# Patient Record
Sex: MALE | Race: WHITE | URBAN - NONMETROPOLITAN AREA
[De-identification: names, ages, dates, MRNs, and addresses within clinical notes are randomized per-mention and may not be internally consistent; named-entity substitution may affect disease eponyms.]

---

## 2021-08-24 ENCOUNTER — WEB ENCOUNTER (OUTPATIENT)
Dept: URBAN - NONMETROPOLITAN AREA CLINIC 13 | Facility: CLINIC | Age: 56
End: 2021-08-24

## 2021-08-24 ENCOUNTER — DASHBOARD ENCOUNTERS (OUTPATIENT)
Age: 56
End: 2021-08-24

## 2021-08-24 ENCOUNTER — OFFICE VISIT (OUTPATIENT)
Dept: URBAN - NONMETROPOLITAN AREA CLINIC 13 | Facility: CLINIC | Age: 56
End: 2021-08-24
Payer: COMMERCIAL

## 2021-08-24 DIAGNOSIS — K57.52 DIVERTICULITIS OF BOTH SMALL AND LARGE INTESTINE: ICD-10-CM

## 2021-08-24 PROCEDURE — 99244 OFF/OP CNSLTJ NEW/EST MOD 40: CPT | Performed by: INTERNAL MEDICINE

## 2021-08-24 RX ORDER — DISULFIRAM 250 MG/1
1 TABLET TABLET ORAL ONCE A DAY
Status: ACTIVE | COMMUNITY

## 2021-08-24 RX ORDER — TRAZODONE HYDROCHLORIDE 100 MG/1
1 TABLET AT BEDTIME TABLET, FILM COATED ORAL ONCE A DAY
Status: ACTIVE | COMMUNITY

## 2021-08-24 RX ORDER — GUAIFENESIN/PHENYLPROPANOLAMIN
AS DIRECTED EXPECTORANT ORAL
Status: ACTIVE | COMMUNITY

## 2021-08-24 RX ORDER — ROSUVASTATIN CALCIUM 10 MG/1
1 TABLET TABLET, FILM COATED ORAL ONCE A DAY
Status: ACTIVE | COMMUNITY

## 2021-08-24 RX ORDER — VENLAFAXINE HYDROCHLORIDE 100 MG/1
1 TABLET WITH FOOD TABLET ORAL ONCE A DAY
Status: ACTIVE | COMMUNITY

## 2021-08-24 RX ORDER — OMEPRAZOLE 10 MG/1
1 CAPSULE 30 MINUTES BEFORE MORNING MEAL CAPSULE, DELAYED RELEASE ORAL ONCE A DAY
Status: ACTIVE | COMMUNITY

## 2021-08-24 RX ORDER — ASPIRIN 81 MG/1
1 TABLET TABLET, CHEWABLE ORAL ONCE A DAY
Status: ACTIVE | COMMUNITY

## 2021-08-24 RX ORDER — MULTIVIT-MIN/IRON/FOLIC ACID/K 18-600-40
AS DIRECTED CAPSULE ORAL
Status: ACTIVE | COMMUNITY

## 2021-08-24 RX ORDER — AMOXICILLIN AND CLAVULANATE POTASSIUM 875; 125 MG/1; MG/1
1 TABLET TABLET, FILM COATED ORAL
Status: ACTIVE | COMMUNITY

## 2021-08-24 RX ORDER — METHYLDOPA/HYDROCHLOROTHIAZIDE 250MG-15MG
AS DIRECTED TABLET ORAL
Status: ACTIVE | COMMUNITY

## 2021-08-24 RX ORDER — PROPRANOLOL HYDROCHLORIDE 80 MG/1
1 TABLET TABLET ORAL TWICE A DAY
Status: ACTIVE | COMMUNITY

## 2021-08-24 RX ORDER — DICYCLOMINE HYDROCHLORIDE 10 MG/1
2 CAPSULES CAPSULE ORAL THREE TIMES A DAY
Status: ACTIVE | COMMUNITY

## 2021-08-24 RX ORDER — NALTREXONE 100 %
AS DIRECTED POWDER (GRAM) MISCELLANEOUS
Status: ACTIVE | COMMUNITY

## 2021-08-24 NOTE — HPI-TODAY'S VISIT:
Mr. Belcher is a 55-year-old gentleman referred by Fern White at the [facility] for diverticulitis.  Earlier in June he developed left-sided abdominal pain.  This was severe so he went to the emergency room.  Imaging suggested diverticulitis.  He was given antibiotics and improved but after stopping antibiotics his pain returned.  His primary care physician gave him another round of antibiotics.  His symptoms have now resolved.  He denies any abdominal pain, rectal bleeding, or diarrhea.  His appetite is better.  He does not eat much fiber.  He does think he had a colonoscopy in the past but cannot recall when this was.

## 2021-08-25 PROBLEM — 307496006: Status: ACTIVE | Noted: 2021-08-24

## 2021-10-21 ENCOUNTER — OFFICE VISIT (OUTPATIENT)
Dept: URBAN - NONMETROPOLITAN AREA SURGERY CENTER 1 | Facility: SURGERY CENTER | Age: 56
End: 2021-10-21

## 2024-11-22 ENCOUNTER — OFFICE VISIT (OUTPATIENT)
Dept: URBAN - NONMETROPOLITAN AREA CLINIC 2 | Facility: CLINIC | Age: 59
End: 2024-11-22